# Patient Record
Sex: FEMALE | Race: BLACK OR AFRICAN AMERICAN | NOT HISPANIC OR LATINO | Employment: FULL TIME | URBAN - METROPOLITAN AREA
[De-identification: names, ages, dates, MRNs, and addresses within clinical notes are randomized per-mention and may not be internally consistent; named-entity substitution may affect disease eponyms.]

---

## 2018-03-13 ENCOUNTER — OFFICE VISIT (OUTPATIENT)
Dept: FAMILY MEDICINE CLINIC | Facility: CLINIC | Age: 43
End: 2018-03-13

## 2018-03-13 VITALS
WEIGHT: 109.5 LBS | RESPIRATION RATE: 12 BRPM | SYSTOLIC BLOOD PRESSURE: 114 MMHG | HEART RATE: 85 BPM | TEMPERATURE: 98.4 F | OXYGEN SATURATION: 99 % | BODY MASS INDEX: 18.69 KG/M2 | DIASTOLIC BLOOD PRESSURE: 76 MMHG | HEIGHT: 64 IN

## 2018-03-13 DIAGNOSIS — R42 LIGHTHEADEDNESS: ICD-10-CM

## 2018-03-13 DIAGNOSIS — R45.89 DEPRESSED MOOD: ICD-10-CM

## 2018-03-13 DIAGNOSIS — F41.9 ANXIETY: ICD-10-CM

## 2018-03-13 DIAGNOSIS — R26.89 LOSS OF BALANCE: ICD-10-CM

## 2018-03-13 DIAGNOSIS — R63.4 WEIGHT LOSS: Primary | ICD-10-CM

## 2018-03-13 DIAGNOSIS — R05.9 COUGH: ICD-10-CM

## 2018-03-13 PROCEDURE — 99203 OFFICE O/P NEW LOW 30 MIN: CPT | Performed by: FAMILY MEDICINE

## 2018-03-13 RX ORDER — ESCITALOPRAM OXALATE 10 MG/1
10 TABLET ORAL DAILY
Qty: 30 TABLET | Refills: 2 | Status: SHIPPED | OUTPATIENT
Start: 2018-03-13 | End: 2020-04-06

## 2018-03-13 NOTE — PROGRESS NOTES
Assessment/Plan       Problems Addressed this Visit     None      Visit Diagnoses     Weight loss    -  Primary    Relevant Orders    CBC & Differential    Comprehensive Metabolic Panel    TSH    T3    T4, Free    Lightheadedness        Relevant Orders    CBC & Differential    Comprehensive Metabolic Panel    TSH    Loss of balance        Anxiety        Relevant Medications    escitalopram (LEXAPRO) 10 MG tablet    Depressed mood        Relevant Medications    escitalopram (LEXAPRO) 10 MG tablet    Cough                Follow up: Return for follow up depends on review of labs and testing.     DISCUSSION  Check labs to eval for other cause. ? Thyroid issues, anemia.   If normal, rec start Lexapro as noted.   Follow up in one month if starts Lexapro  Will call with results.     Cough may be allergy or GERD . Try OTC claritin and call if not helpful. She agrees      MEDICATIONS PRESCRIBED  Requested Prescriptions     Signed Prescriptions Disp Refills   • escitalopram (LEXAPRO) 10 MG tablet 30 tablet 2     Sig: Take 1 tablet by mouth Daily.        -------------------------------------------    Subjective     Chief Complaint   Patient presents with   • Stress   • off balance     more when ready to start cycle   • Establish Care         History of Present Illness    Lightheadedness  Off balance and some dizziness  Hard to gain wt  dry skin  Decreased concentration  Not losing wt.   Lose appetite with worry    Sx worse around period  LMP 2018, regular    Saw Dr Shane 2 months, was told it was stress  Did labs and was normal ( not recently)    Anxious : short of breath      Anxious and worry a lot    Depression  Since father  and has been able to function but worse since mother   No SI    Meds in the past: antidepressant, Celexa. Took  X 3 weeks. No help with med      Mother  last week. HAd bad heart valve, developed fluid in lungs, ECMO. HAd gotten the flu and worse and ARDS    Decreased sleep  3 kids  "(20,16, 13). Aydin Bennett    No new stress    Cough  Off and on since 2/20/2018  No cold sx  Had been at  with mom  No fever  Never a smoker  Occ heartburn, not worse  ? Allergies, eyes itch in the summer  Some sneeze this past weekend          Past Medical History,Medications, Allergies, and social history was reviewed.      Review of Systems   Constitutional: Positive for fatigue and unexpected weight loss.   HENT: Negative.    Respiratory: Positive for cough. Negative for shortness of breath.    Cardiovascular: Positive for palpitations. Negative for chest pain and leg swelling.   Gastrointestinal: Negative.    Musculoskeletal: Negative.    Neurological: Positive for dizziness and light-headedness.   Psychiatric/Behavioral: Positive for depressed mood and stress. Negative for suicidal ideas. The patient is nervous/anxious.        Objective     Vitals:    03/13/18 1405   BP: 114/76   Pulse: 85   Resp: 12   Temp: 98.4 °F (36.9 °C)   TempSrc: Temporal Artery    SpO2: 99%   Weight: 49.7 kg (109 lb 8 oz)   Height: 162.6 cm (64\")          Physical Exam   Constitutional: She is oriented to person, place, and time. She appears well-developed and well-nourished. No distress.   HENT:   Head: Normocephalic.   Right Ear: Hearing, tympanic membrane, external ear and ear canal normal.   Left Ear: Hearing, tympanic membrane, external ear and ear canal normal.   Nose: Nose normal.   Mouth/Throat: Oropharynx is clear and moist. No oropharyngeal exudate.   Eyes: Conjunctivae and EOM are normal. Pupils are equal, round, and reactive to light.   Neck: Normal range of motion. Neck supple. No thyromegaly present.   Cardiovascular: Normal rate, regular rhythm and normal heart sounds.  Exam reveals no gallop and no friction rub.    No murmur heard.  Pulmonary/Chest: Effort normal and breath sounds normal. No respiratory distress. She has no wheezes. She has no rales.   Abdominal: Soft. Bowel sounds are normal. She exhibits " no distension and no mass. There is no tenderness. There is no rebound and no guarding.   Musculoskeletal: She exhibits no edema.   Lymphadenopathy:     She has no cervical adenopathy.   Neurological: She is alert and oriented to person, place, and time. She displays no atrophy. No cranial nerve deficit. She exhibits normal muscle tone. She displays a negative Romberg sign. Gait normal.   Reflex Scores:       Patellar reflexes are 2+ on the right side and 2+ on the left side.  Skin: Skin is warm. She is not diaphoretic.   Psychiatric: She has a normal mood and affect. Her behavior is normal. Judgment and thought content normal.   Nursing note and vitals reviewed.              Jaime Arizmendi MD

## 2018-03-14 LAB
ALBUMIN SERPL-MCNC: 4 G/DL (ref 3.2–4.8)
ALBUMIN/GLOB SERPL: 1.3 G/DL (ref 1.5–2.5)
ALP SERPL-CCNC: 47 U/L (ref 25–100)
ALT SERPL-CCNC: 12 U/L (ref 7–40)
AST SERPL-CCNC: 16 U/L (ref 0–33)
BASOPHILS # BLD AUTO: 0.02 10*3/MM3 (ref 0–0.2)
BASOPHILS NFR BLD AUTO: 0.3 % (ref 0–1)
BILIRUB SERPL-MCNC: 0.4 MG/DL (ref 0.3–1.2)
BUN SERPL-MCNC: 13 MG/DL (ref 9–23)
BUN/CREAT SERPL: 16.3 (ref 7–25)
CALCIUM SERPL-MCNC: 8.9 MG/DL (ref 8.7–10.4)
CHLORIDE SERPL-SCNC: 108 MMOL/L (ref 99–109)
CO2 SERPL-SCNC: 26 MMOL/L (ref 20–31)
CREAT SERPL-MCNC: 0.8 MG/DL (ref 0.6–1.3)
EOSINOPHIL # BLD AUTO: 0.27 10*3/MM3 (ref 0–0.3)
EOSINOPHIL NFR BLD AUTO: 3.8 % (ref 0–3)
ERYTHROCYTE [DISTWIDTH] IN BLOOD BY AUTOMATED COUNT: 14.5 % (ref 11.3–14.5)
GFR SERPLBLD CREATININE-BSD FMLA CKD-EPI: 79 ML/MIN/1.73
GFR SERPLBLD CREATININE-BSD FMLA CKD-EPI: 96 ML/MIN/1.73
GLOBULIN SER CALC-MCNC: 3 GM/DL
GLUCOSE SERPL-MCNC: 87 MG/DL (ref 70–100)
HCT VFR BLD AUTO: 36.2 % (ref 34.5–44)
HGB BLD-MCNC: 11.4 G/DL (ref 11.5–15.5)
IMM GRANULOCYTES # BLD: 0.02 10*3/MM3 (ref 0–0.03)
IMM GRANULOCYTES NFR BLD: 0.3 % (ref 0–0.6)
LYMPHOCYTES # BLD AUTO: 2.63 10*3/MM3 (ref 0.6–4.8)
LYMPHOCYTES NFR BLD AUTO: 36.5 % (ref 24–44)
MCH RBC QN AUTO: 29.2 PG (ref 27–31)
MCHC RBC AUTO-ENTMCNC: 31.5 G/DL (ref 32–36)
MCV RBC AUTO: 92.8 FL (ref 80–99)
MONOCYTES # BLD AUTO: 0.87 10*3/MM3 (ref 0–1)
MONOCYTES NFR BLD AUTO: 12.1 % (ref 0–12)
NEUTROPHILS # BLD AUTO: 3.39 10*3/MM3 (ref 1.5–8.3)
NEUTROPHILS NFR BLD AUTO: 47 % (ref 41–71)
PLATELET # BLD AUTO: 248 10*3/MM3 (ref 150–450)
POTASSIUM SERPL-SCNC: 4.6 MMOL/L (ref 3.5–5.5)
PROT SERPL-MCNC: 7 G/DL (ref 5.7–8.2)
RBC # BLD AUTO: 3.9 10*6/MM3 (ref 3.89–5.14)
SODIUM SERPL-SCNC: 141 MMOL/L (ref 132–146)
T3 SERPL-MCNC: 101 NG/DL (ref 71–180)
T4 FREE SERPL-MCNC: 1.17 NG/DL (ref 0.89–1.76)
TSH SERPL DL<=0.005 MIU/L-ACNC: 1.37 MIU/ML (ref 0.35–5.35)
WBC # BLD AUTO: 7.2 10*3/MM3 (ref 3.5–10.8)

## 2018-03-16 ENCOUNTER — TELEPHONE (OUTPATIENT)
Dept: FAMILY MEDICINE CLINIC | Facility: CLINIC | Age: 43
End: 2018-03-16

## 2018-03-16 NOTE — TELEPHONE ENCOUNTER
----- Message from Radha Hernandez sent at 3/16/2018 12:28 PM EDT -----  PT RETURNED YOUR CALL I PROVIDED THE FOLLOWING :     Please call: Blood work shows a very mild/minimal anemia.  Normal is 11.5 and hers is 11.4.  This would not be causing any of her symptoms.Kidney function and liver function tests are normal.  Blood sugar is normal.Thyroid levels are also normal.Recommend to go ahead and start Lexapro as we discussed and follow-up in one month.      PT UNDERSTOOD

## 2020-04-06 ENCOUNTER — OFFICE VISIT (OUTPATIENT)
Dept: FAMILY MEDICINE CLINIC | Facility: CLINIC | Age: 45
End: 2020-04-06

## 2020-04-06 VITALS
TEMPERATURE: 98.1 F | WEIGHT: 110.6 LBS | HEIGHT: 64 IN | HEART RATE: 72 BPM | OXYGEN SATURATION: 99 % | SYSTOLIC BLOOD PRESSURE: 118 MMHG | BODY MASS INDEX: 18.88 KG/M2 | RESPIRATION RATE: 16 BRPM | DIASTOLIC BLOOD PRESSURE: 76 MMHG

## 2020-04-06 DIAGNOSIS — R42 LIGHTHEADEDNESS: Primary | ICD-10-CM

## 2020-04-06 DIAGNOSIS — F41.9 ANXIETY: ICD-10-CM

## 2020-04-06 LAB
ALBUMIN SERPL-MCNC: 4.2 G/DL (ref 3.5–5.2)
ALBUMIN/GLOB SERPL: 1.4 G/DL
ALP SERPL-CCNC: 41 U/L (ref 39–117)
ALT SERPL-CCNC: 14 U/L (ref 1–33)
AST SERPL-CCNC: 16 U/L (ref 1–32)
BASOPHILS # BLD AUTO: 0.05 10*3/MM3 (ref 0–0.2)
BASOPHILS NFR BLD AUTO: 0.9 % (ref 0–1.5)
BILIRUB SERPL-MCNC: 0.5 MG/DL (ref 0.2–1.2)
BUN SERPL-MCNC: 10 MG/DL (ref 6–20)
BUN/CREAT SERPL: 10.3 (ref 7–25)
CALCIUM SERPL-MCNC: 9.4 MG/DL (ref 8.6–10.5)
CHLORIDE SERPL-SCNC: 102 MMOL/L (ref 98–107)
CO2 SERPL-SCNC: 23.4 MMOL/L (ref 22–29)
CREAT SERPL-MCNC: 0.97 MG/DL (ref 0.57–1)
EOSINOPHIL # BLD AUTO: 0.46 10*3/MM3 (ref 0–0.4)
EOSINOPHIL NFR BLD AUTO: 8.5 % (ref 0.3–6.2)
ERYTHROCYTE [DISTWIDTH] IN BLOOD BY AUTOMATED COUNT: 13.1 % (ref 12.3–15.4)
GLOBULIN SER CALC-MCNC: 3.1 GM/DL
GLUCOSE SERPL-MCNC: 73 MG/DL (ref 65–99)
HCT VFR BLD AUTO: 38.3 % (ref 34–46.6)
HGB BLD-MCNC: 12.4 G/DL (ref 12–15.9)
IMM GRANULOCYTES # BLD AUTO: 0.01 10*3/MM3 (ref 0–0.05)
IMM GRANULOCYTES NFR BLD AUTO: 0.2 % (ref 0–0.5)
LYMPHOCYTES # BLD AUTO: 1.86 10*3/MM3 (ref 0.7–3.1)
LYMPHOCYTES NFR BLD AUTO: 34.5 % (ref 19.6–45.3)
MCH RBC QN AUTO: 29.9 PG (ref 26.6–33)
MCHC RBC AUTO-ENTMCNC: 32.4 G/DL (ref 31.5–35.7)
MCV RBC AUTO: 92.3 FL (ref 79–97)
MONOCYTES # BLD AUTO: 0.59 10*3/MM3 (ref 0.1–0.9)
MONOCYTES NFR BLD AUTO: 10.9 % (ref 5–12)
NEUTROPHILS # BLD AUTO: 2.42 10*3/MM3 (ref 1.7–7)
NEUTROPHILS NFR BLD AUTO: 45 % (ref 42.7–76)
NRBC BLD AUTO-RTO: 0 /100 WBC (ref 0–0.2)
PLATELET # BLD AUTO: 259 10*3/MM3 (ref 140–450)
POTASSIUM SERPL-SCNC: 4.8 MMOL/L (ref 3.5–5.2)
PROT SERPL-MCNC: 7.3 G/DL (ref 6–8.5)
RBC # BLD AUTO: 4.15 10*6/MM3 (ref 3.77–5.28)
SODIUM SERPL-SCNC: 135 MMOL/L (ref 136–145)
WBC # BLD AUTO: 5.39 10*3/MM3 (ref 3.4–10.8)

## 2020-04-06 PROCEDURE — 99214 OFFICE O/P EST MOD 30 MIN: CPT | Performed by: FAMILY MEDICINE

## 2020-04-06 RX ORDER — ESCITALOPRAM OXALATE 10 MG/1
10 TABLET ORAL DAILY
Qty: 30 TABLET | Refills: 2 | Status: SHIPPED | OUTPATIENT
Start: 2020-04-06 | End: 2021-02-04 | Stop reason: SDUPTHER

## 2020-04-06 RX ORDER — SULFAMETHOXAZOLE AND TRIMETHOPRIM 800; 160 MG/1; MG/1
1 TABLET ORAL 2 TIMES DAILY
COMMUNITY
End: 2021-02-04

## 2020-04-06 NOTE — PROGRESS NOTES
Assessment/Plan       Problems Addressed this Visit     None      Visit Diagnoses     Lightheadedness    -  Primary    Relevant Orders    CBC & Differential    Comprehensive Metabolic Panel    Anxiety        Relevant Medications    escitalopram (Lexapro) 10 MG tablet            Follow up: Return if symptoms worsen or fail to improve, for follow up depends on review of labs and testing.     DISCUSSION  Lightheadedness.  May be due to anxiety.  Has had very minimal slight anemia in the past.  Recheck CBC and CMP.  In the meantime, start Lexapro.  Side effects previously explained.  If needs to come off of this in the future, will need to wean off of it.  Further plan once labs back.  She agrees to try the Lexapro again and understands that she needs to take it daily.          MEDICATIONS PRESCRIBED  Requested Prescriptions     Signed Prescriptions Disp Refills   • escitalopram (Lexapro) 10 MG tablet 30 tablet 2     Sig: Take 1 tablet by mouth Daily.          -------------------------------------------    Subjective     Chief Complaint   Patient presents with   • Anxiety     lightheaded, sweating, nervous, anxious, not sleeping         Anxiety   Presents for follow-up (x years and worse in last 1-2 months. Started aroudn the time of pandemic( gettig worse)) visit. Symptoms include insomnia, irritability and nervous/anxious behavior. Patient reports no depressed mood, panic or suicidal ideas. Symptoms occur constantly. The severity of symptoms is moderate. The quality of sleep is poor.         Had been on Lexapro and did not take it like how she was supposed. Did not take daily    No side effects (? )    Lives with     Lightheadedness  Gets Lightheaded and dizzy feeling  No syncope  No chest pain   Occ has to catch breath    Seems to be when anxious    Headaches  Getting headaches as well  Frontal head and temples  Comes and goes  Vision ok , no change   No weakness no numbness      Not  "working  [employer]  Extended off til 4/20/2020      LMP: last week. Heavy 1st day.     On Bactrim for UTI  Has been on for 5 days  Getting better          Social History     Tobacco Use   Smoking Status Never Smoker   Smokeless Tobacco Never Used          Past Medical History,Medications, Allergies, and social history was reviewed.          Review of Systems   Constitutional: Positive for irritability.   HENT: Negative.    Respiratory: Negative.    Cardiovascular: Negative.    Gastrointestinal: Negative.    Psychiatric/Behavioral: Negative for suicidal ideas and depressed mood. The patient is nervous/anxious and has insomnia.        Objective     Vitals:    04/06/20 1109   BP: 118/76   BP Location: Left arm   Patient Position: Sitting   Cuff Size: Small Adult   Pulse: 72   Resp: 16   Temp: 98.1 °F (36.7 °C)   TempSrc: Temporal   SpO2: 99%   Weight: 50.2 kg (110 lb 9.6 oz)   Height: 162.6 cm (64.02\")          Physical Exam   Constitutional: She appears well-developed and well-nourished.   HENT:   Head: Normocephalic and atraumatic.   Right Ear: Hearing, tympanic membrane, external ear and ear canal normal.   Left Ear: Hearing, tympanic membrane, external ear and ear canal normal.   Mouth/Throat: Oropharynx is clear and moist.   Eyes: Pupils are equal, round, and reactive to light. Conjunctivae and EOM are normal.   Neck: Normal range of motion. Neck supple. No thyromegaly present.   Cardiovascular: Normal rate, regular rhythm and normal heart sounds. Exam reveals no gallop and no friction rub.   No murmur heard.  Pulmonary/Chest: Effort normal and breath sounds normal. No respiratory distress. She has no wheezes. She has no rales.   Musculoskeletal: She exhibits no edema.   Neurological: She is alert.   Skin: Skin is warm and dry.   Psychiatric: She has a normal mood and affect.   Nursing note and vitals reviewed.                Jaime Arizmendi MD    "

## 2021-02-04 ENCOUNTER — OFFICE VISIT (OUTPATIENT)
Dept: FAMILY MEDICINE CLINIC | Facility: CLINIC | Age: 46
End: 2021-02-04

## 2021-02-04 VITALS
SYSTOLIC BLOOD PRESSURE: 120 MMHG | WEIGHT: 116 LBS | HEART RATE: 78 BPM | DIASTOLIC BLOOD PRESSURE: 72 MMHG | RESPIRATION RATE: 18 BRPM | BODY MASS INDEX: 19.81 KG/M2 | HEIGHT: 64 IN | TEMPERATURE: 97.7 F

## 2021-02-04 DIAGNOSIS — R42 LIGHTHEADEDNESS: Primary | ICD-10-CM

## 2021-02-04 DIAGNOSIS — F41.9 ANXIETY: ICD-10-CM

## 2021-02-04 PROCEDURE — 99213 OFFICE O/P EST LOW 20 MIN: CPT | Performed by: FAMILY MEDICINE

## 2021-02-04 RX ORDER — ESCITALOPRAM OXALATE 10 MG/1
10 TABLET ORAL DAILY
Qty: 30 TABLET | Refills: 5 | Status: SHIPPED | OUTPATIENT
Start: 2021-02-04 | End: 2021-06-21 | Stop reason: SDUPTHER

## 2021-02-04 NOTE — PROGRESS NOTES
Assessment/Plan       Diagnoses and all orders for this visit:    1. Lightheadedness (Primary)    2. Anxiety  -     escitalopram (Lexapro) 10 MG tablet; Take 1 tablet by mouth Daily.  Dispense: 30 tablet; Refill: 5           Follow up: Return if symptoms worsen or fail to improve.     DISCUSSION  Chronic lightheadedness.  Seems to get better when on Lexapro.  Has been off of the Lexapro since October.  She would like to restart.  She has had this issues for the last 13 years.  Has had previous MRI and tilt table examination.    Restart Lexapro 10 mg daily.  Recommend keep diary and let me know if the dizziness/lightheadedness does not improve.  She agrees.          MEDICATIONS PRESCRIBED  Requested Prescriptions     Signed Prescriptions Disp Refills   • escitalopram (Lexapro) 10 MG tablet 30 tablet 5     Sig: Take 1 tablet by mouth Daily.            -------------------------------------------    Subjective     Chief Complaint   Patient presents with   • Dizziness   • Night Sweats   • Anxiety         History of Present Illness    Off Lexapro since Oct  Worse since Dec  Feels off balance  Worse when going o have period    dizziness feeling  Comes and goes  Just happens and not when getting up    Has had off and on for quite some time    Ears itch but no hearing loss  No ringing usually    No weakness or numbness    Wears contacts  No double vision    No recent imaging of the brain     Some anxiety and had less anxiety when on the lexapro  Wants to go back on this    Has had for 13 years has had the dizziness  Had MRI brain 6-7 yrs ago and was because of the dizziness  And tilt table 6-7 yrs ago.     Night sweats when off the lexapro    LMP: on now          Social History     Tobacco Use   Smoking Status Never Smoker   Smokeless Tobacco Never Used          Past Medical History,Medications, Allergies, and social history was reviewed.          Review of Systems   Constitutional: Negative.    HENT: Negative.   "  Respiratory: Negative.    Cardiovascular: Negative.    Gastrointestinal: Negative.    Neurological: Positive for dizziness and light-headedness.   Psychiatric/Behavioral: The patient is nervous/anxious.        Objective     Vitals:    02/04/21 1614   BP: 120/72   Pulse: 78   Resp: 18   Temp: 97.7 °F (36.5 °C)   Weight: 52.6 kg (116 lb)   Height: 162.6 cm (64\")          Physical Exam  Vitals signs and nursing note reviewed.   Constitutional:       General: She is not in acute distress.     Appearance: She is well-developed. She is not ill-appearing.   HENT:      Head: Normocephalic and atraumatic.      Right Ear: Hearing, tympanic membrane, ear canal and external ear normal.      Left Ear: Hearing, tympanic membrane, ear canal and external ear normal.      Nose: Nose normal. No congestion or rhinorrhea.      Mouth/Throat:      Mouth: Mucous membranes are moist.      Pharynx: No oropharyngeal exudate or posterior oropharyngeal erythema.   Eyes:      General:         Right eye: No discharge.         Left eye: No discharge.      Conjunctiva/sclera: Conjunctivae normal.      Pupils: Pupils are equal, round, and reactive to light.   Neck:      Musculoskeletal: Normal range of motion and neck supple.      Thyroid: No thyromegaly.   Cardiovascular:      Rate and Rhythm: Normal rate and regular rhythm.      Heart sounds: Normal heart sounds. No murmur. No friction rub. No gallop.    Pulmonary:      Effort: Pulmonary effort is normal. No respiratory distress.      Breath sounds: Normal breath sounds. No wheezing or rales.   Abdominal:      General: Bowel sounds are normal. There is no distension.      Palpations: Abdomen is soft. There is no mass.      Tenderness: There is no abdominal tenderness. There is no guarding or rebound.   Musculoskeletal:      Right lower leg: No edema.      Left lower leg: No edema.   Lymphadenopathy:      Cervical: No cervical adenopathy.   Skin:     General: Skin is warm and dry.      " Coloration: Skin is not jaundiced or pale.   Neurological:      General: No focal deficit present.      Mental Status: She is alert. Mental status is at baseline.      Cranial Nerves: No cranial nerve deficit.      Gait: Gait normal.      Comments: No focal deficits.  Upper extremity strength is normal.  Reflexes normal.  Romberg is negative.   Psychiatric:         Mood and Affect: Mood normal.         Behavior: Behavior normal.                     Jaime Arizmendi MD

## 2021-06-21 ENCOUNTER — TELEPHONE (OUTPATIENT)
Dept: FAMILY MEDICINE CLINIC | Facility: CLINIC | Age: 46
End: 2021-06-21

## 2021-06-21 ENCOUNTER — OFFICE VISIT (OUTPATIENT)
Dept: FAMILY MEDICINE CLINIC | Facility: CLINIC | Age: 46
End: 2021-06-21

## 2021-06-21 VITALS
RESPIRATION RATE: 18 BRPM | HEART RATE: 82 BPM | SYSTOLIC BLOOD PRESSURE: 120 MMHG | TEMPERATURE: 98.7 F | BODY MASS INDEX: 19.26 KG/M2 | WEIGHT: 112.8 LBS | HEIGHT: 64 IN | DIASTOLIC BLOOD PRESSURE: 78 MMHG

## 2021-06-21 DIAGNOSIS — R42 DIZZINESS: ICD-10-CM

## 2021-06-21 DIAGNOSIS — F41.9 ANXIETY: Primary | ICD-10-CM

## 2021-06-21 DIAGNOSIS — L72.3 INFECTED SEBACEOUS CYST OF SKIN: ICD-10-CM

## 2021-06-21 DIAGNOSIS — L08.9 INFECTED SEBACEOUS CYST OF SKIN: ICD-10-CM

## 2021-06-21 PROCEDURE — 99214 OFFICE O/P EST MOD 30 MIN: CPT | Performed by: FAMILY MEDICINE

## 2021-06-21 RX ORDER — SULFAMETHOXAZOLE AND TRIMETHOPRIM 800; 160 MG/1; MG/1
1 TABLET ORAL 2 TIMES DAILY
Qty: 20 TABLET | Refills: 0 | Status: SHIPPED | OUTPATIENT
Start: 2021-06-21 | End: 2021-12-29

## 2021-06-21 RX ORDER — HYDROXYZINE HYDROCHLORIDE 25 MG/1
25 TABLET, FILM COATED ORAL 3 TIMES DAILY PRN
Qty: 30 TABLET | Refills: 1 | Status: SHIPPED | OUTPATIENT
Start: 2021-06-21 | End: 2022-06-14

## 2021-06-21 RX ORDER — ESCITALOPRAM OXALATE 20 MG/1
20 TABLET ORAL DAILY
Qty: 30 TABLET | Refills: 5 | Status: SHIPPED | OUTPATIENT
Start: 2021-06-21 | End: 2022-06-14

## 2021-06-21 NOTE — PROGRESS NOTES
Assessment/Plan       Diagnoses and all orders for this visit:    1. Anxiety (Primary)  -     escitalopram (Lexapro) 20 MG tablet; Take 1 tablet by mouth Daily.  Dispense: 30 tablet; Refill: 5  -     hydrOXYzine (ATARAX) 25 MG tablet; Take 1 tablet by mouth 3 (Three) Times a Day As Needed for Anxiety.  Dispense: 30 tablet; Refill: 1    2. Dizziness  -     CBC & Differential  -     Comprehensive Metabolic Panel    3. Infected sebaceous cyst of skin  -     sulfamethoxazole-trimethoprim (Bactrim DS) 800-160 MG per tablet; Take 1 tablet by mouth 2 (Two) Times a Day.  Dispense: 20 tablet; Refill: 0           Follow up: Return if symptoms worsen or fail to improve, for follow up depends on review of labs and testing.     DISCUSSION  Anxiety.  Worsening since sister diagnosed with breast cancer.  Lexapro 20 mg daily.  Hydroxyzine 25 mg 3 times a day as needed for anxiety and sleep.    Dizziness.  Check CBC and CMP.    Infected sebaceous cyst of skin right cheek.  Bactrim DS 1 twice a day.  Call if not improving.          MEDICATIONS PRESCRIBED  Requested Prescriptions     Signed Prescriptions Disp Refills   • escitalopram (Lexapro) 20 MG tablet 30 tablet 5     Sig: Take 1 tablet by mouth Daily.   • hydrOXYzine (ATARAX) 25 MG tablet 30 tablet 1     Sig: Take 1 tablet by mouth 3 (Three) Times a Day As Needed for Anxiety.   • sulfamethoxazole-trimethoprim (Bactrim DS) 800-160 MG per tablet 20 tablet 0     Sig: Take 1 tablet by mouth 2 (Two) Times a Day.          -------------------------------------------    Subjective     Chief Complaint   Patient presents with   • Anxiety         History of Present Illness    Anxiety  Increased anxiety  On lexapro 10 mg   Was helping some  Sister dx with breast cancer  Wakes up sweating at night  Feels like she needs to be there to help her  Tx started today. Getting chemotherapy. [facility]   Age 36 and has 2 kids    Patient gets breast lumps but go away with cycle    Decreased  "sleep    Children issues and taking care of sisters kids     Hard time work   Not able to get FMLA for siblings    Work 6 am to 5 pm.    Depressed feeling  No suicidal ideation    Dizziness still  Comes and goes  Worse when on period  Feels woozy  Now, more with anxiety  Has to leave the station when feels dizzy  No falls  No syncope    LMP now. Not heavy bleeding. 1st day some heaviness.     Had mammogram in Jan 2021 and was ok  [facility]  Sees Dr Potts          Social History     Tobacco Use   Smoking Status Never Smoker   Smokeless Tobacco Never Used          Past Medical History,Medications, Allergies, and social history was reviewed.          Review of Systems   Constitutional: Negative.    HENT: Negative.    Respiratory: Negative.    Cardiovascular: Negative.    Gastrointestinal: Negative.    Neurological: Negative.    Psychiatric/Behavioral: Positive for sleep disturbance, depressed mood and stress. Negative for suicidal ideas. The patient is nervous/anxious.        Objective     Vitals:    06/21/21 1036   BP: 120/78   Pulse: 82   Resp: 18   Temp: 98.7 °F (37.1 °C)   Weight: 51.2 kg (112 lb 12.8 oz)   Height: 162.6 cm (64\")          Physical Exam  Vitals and nursing note reviewed.   Constitutional:       Appearance: She is well-developed.   HENT:      Head: Normocephalic and atraumatic.      Right Ear: Hearing and external ear normal.      Left Ear: Hearing and external ear normal.   Eyes:      Conjunctiva/sclera: Conjunctivae normal.      Pupils: Pupils are equal, round, and reactive to light.   Cardiovascular:      Rate and Rhythm: Normal rate and regular rhythm.      Heart sounds: Normal heart sounds. No murmur heard.   No friction rub.   Pulmonary:      Effort: Pulmonary effort is normal. No respiratory distress.      Breath sounds: Normal breath sounds. No wheezing or rales.   Skin:     General: Skin is warm.   Neurological:      Mental Status: She is alert and oriented to person, place, and time. "   Psychiatric:         Mood and Affect: Mood is anxious.         Behavior: Behavior normal.       Right cheek: 1 cm sebaceous cyst.  Inflamed with some mild discharge.  Small right anterior lymph node.  Cervical.              Jaime Arizmendi MD

## 2021-06-22 LAB
ALBUMIN SERPL-MCNC: 4.2 G/DL (ref 3.5–5.2)
ALBUMIN/GLOB SERPL: 1.6 G/DL
ALP SERPL-CCNC: 49 U/L (ref 39–117)
ALT SERPL-CCNC: 10 U/L (ref 1–33)
AST SERPL-CCNC: 17 U/L (ref 1–32)
BASOPHILS # BLD AUTO: 0.04 10*3/MM3 (ref 0–0.2)
BASOPHILS NFR BLD AUTO: 0.6 % (ref 0–1.5)
BILIRUB SERPL-MCNC: 0.3 MG/DL (ref 0–1.2)
BUN SERPL-MCNC: 15 MG/DL (ref 6–20)
BUN/CREAT SERPL: 19 (ref 7–25)
CALCIUM SERPL-MCNC: 9.4 MG/DL (ref 8.6–10.5)
CHLORIDE SERPL-SCNC: 107 MMOL/L (ref 98–107)
CO2 SERPL-SCNC: 25.2 MMOL/L (ref 22–29)
CREAT SERPL-MCNC: 0.79 MG/DL (ref 0.57–1)
EOSINOPHIL # BLD AUTO: 0.36 10*3/MM3 (ref 0–0.4)
EOSINOPHIL NFR BLD AUTO: 5.8 % (ref 0.3–6.2)
ERYTHROCYTE [DISTWIDTH] IN BLOOD BY AUTOMATED COUNT: 12.7 % (ref 12.3–15.4)
GLOBULIN SER CALC-MCNC: 2.7 GM/DL
GLUCOSE SERPL-MCNC: 89 MG/DL (ref 65–99)
HCT VFR BLD AUTO: 37.6 % (ref 34–46.6)
HGB BLD-MCNC: 12.3 G/DL (ref 12–15.9)
IMM GRANULOCYTES # BLD AUTO: 0.02 10*3/MM3 (ref 0–0.05)
IMM GRANULOCYTES NFR BLD AUTO: 0.3 % (ref 0–0.5)
LYMPHOCYTES # BLD AUTO: 1.99 10*3/MM3 (ref 0.7–3.1)
LYMPHOCYTES NFR BLD AUTO: 32.1 % (ref 19.6–45.3)
MCH RBC QN AUTO: 30.5 PG (ref 26.6–33)
MCHC RBC AUTO-ENTMCNC: 32.7 G/DL (ref 31.5–35.7)
MCV RBC AUTO: 93.3 FL (ref 79–97)
MONOCYTES # BLD AUTO: 0.66 10*3/MM3 (ref 0.1–0.9)
MONOCYTES NFR BLD AUTO: 10.6 % (ref 5–12)
NEUTROPHILS # BLD AUTO: 3.13 10*3/MM3 (ref 1.7–7)
NEUTROPHILS NFR BLD AUTO: 50.6 % (ref 42.7–76)
NRBC BLD AUTO-RTO: 0 /100 WBC (ref 0–0.2)
PLATELET # BLD AUTO: 278 10*3/MM3 (ref 140–450)
POTASSIUM SERPL-SCNC: 4.5 MMOL/L (ref 3.5–5.2)
PROT SERPL-MCNC: 6.9 G/DL (ref 6–8.5)
RBC # BLD AUTO: 4.03 10*6/MM3 (ref 3.77–5.28)
SODIUM SERPL-SCNC: 141 MMOL/L (ref 136–145)
WBC # BLD AUTO: 6.2 10*3/MM3 (ref 3.4–10.8)

## 2021-12-29 ENCOUNTER — OFFICE VISIT (OUTPATIENT)
Dept: FAMILY MEDICINE CLINIC | Facility: CLINIC | Age: 46
End: 2021-12-29

## 2021-12-29 VITALS
BODY MASS INDEX: 19.46 KG/M2 | RESPIRATION RATE: 18 BRPM | TEMPERATURE: 98.2 F | HEART RATE: 78 BPM | HEIGHT: 64 IN | DIASTOLIC BLOOD PRESSURE: 68 MMHG | WEIGHT: 114 LBS | SYSTOLIC BLOOD PRESSURE: 110 MMHG

## 2021-12-29 DIAGNOSIS — E55.9 VITAMIN D DEFICIENCY: ICD-10-CM

## 2021-12-29 DIAGNOSIS — L65.9 HAIR LOSS: Primary | ICD-10-CM

## 2021-12-29 DIAGNOSIS — R05.3 PERSISTENT COUGH FOR 3 WEEKS OR LONGER: ICD-10-CM

## 2021-12-29 PROCEDURE — 99213 OFFICE O/P EST LOW 20 MIN: CPT | Performed by: PHYSICIAN ASSISTANT

## 2021-12-29 RX ORDER — MONTELUKAST SODIUM 10 MG/1
10 TABLET ORAL NIGHTLY
Qty: 30 TABLET | Refills: 2 | Status: SHIPPED | OUTPATIENT
Start: 2021-12-29 | End: 2022-06-14

## 2021-12-29 RX ORDER — AZITHROMYCIN 250 MG/1
TABLET, FILM COATED ORAL
Qty: 6 TABLET | Refills: 0 | Status: SHIPPED | OUTPATIENT
Start: 2021-12-29 | End: 2022-06-14

## 2021-12-30 LAB
25(OH)D3+25(OH)D2 SERPL-MCNC: 30.5 NG/ML (ref 30–100)
ALBUMIN SERPL-MCNC: 3.7 G/DL (ref 3.8–4.8)
ALBUMIN/GLOB SERPL: 1.3 {RATIO} (ref 1.2–2.2)
ALP SERPL-CCNC: 47 IU/L (ref 44–121)
ALT SERPL-CCNC: 13 IU/L (ref 0–32)
AST SERPL-CCNC: 17 IU/L (ref 0–40)
BASOPHILS # BLD AUTO: 0 X10E3/UL (ref 0–0.2)
BASOPHILS NFR BLD AUTO: 1 %
BILIRUB SERPL-MCNC: 0.3 MG/DL (ref 0–1.2)
BUN SERPL-MCNC: 10 MG/DL (ref 6–24)
BUN/CREAT SERPL: 11 (ref 9–23)
CALCIUM SERPL-MCNC: 9.2 MG/DL (ref 8.7–10.2)
CHLORIDE SERPL-SCNC: 107 MMOL/L (ref 96–106)
CO2 SERPL-SCNC: 26 MMOL/L (ref 20–29)
CREAT SERPL-MCNC: 0.87 MG/DL (ref 0.57–1)
EOSINOPHIL # BLD AUTO: 0.3 X10E3/UL (ref 0–0.4)
EOSINOPHIL NFR BLD AUTO: 5 %
ERYTHROCYTE [DISTWIDTH] IN BLOOD BY AUTOMATED COUNT: 12.6 % (ref 11.7–15.4)
GLOBULIN SER CALC-MCNC: 2.8 G/DL (ref 1.5–4.5)
GLUCOSE SERPL-MCNC: 67 MG/DL (ref 65–99)
HCT VFR BLD AUTO: 34.6 % (ref 34–46.6)
HGB BLD-MCNC: 11.3 G/DL (ref 11.1–15.9)
IMM GRANULOCYTES # BLD AUTO: 0 X10E3/UL (ref 0–0.1)
IMM GRANULOCYTES NFR BLD AUTO: 0 %
IRON SATN MFR SERPL: 33 % (ref 15–55)
IRON SERPL-MCNC: 89 UG/DL (ref 27–159)
LYMPHOCYTES # BLD AUTO: 2.1 X10E3/UL (ref 0.7–3.1)
LYMPHOCYTES NFR BLD AUTO: 29 %
MCH RBC QN AUTO: 30.5 PG (ref 26.6–33)
MCHC RBC AUTO-ENTMCNC: 32.7 G/DL (ref 31.5–35.7)
MCV RBC AUTO: 94 FL (ref 79–97)
MONOCYTES # BLD AUTO: 0.8 X10E3/UL (ref 0.1–0.9)
MONOCYTES NFR BLD AUTO: 11 %
NEUTROPHILS # BLD AUTO: 4 X10E3/UL (ref 1.4–7)
NEUTROPHILS NFR BLD AUTO: 54 %
PLATELET # BLD AUTO: 296 X10E3/UL (ref 150–450)
POTASSIUM SERPL-SCNC: 4.5 MMOL/L (ref 3.5–5.2)
PROT SERPL-MCNC: 6.5 G/DL (ref 6–8.5)
RBC # BLD AUTO: 3.7 X10E6/UL (ref 3.77–5.28)
SODIUM SERPL-SCNC: 145 MMOL/L (ref 134–144)
T4 FREE SERPL-MCNC: 0.96 NG/DL (ref 0.82–1.77)
TIBC SERPL-MCNC: 273 UG/DL (ref 250–450)
TSH SERPL DL<=0.005 MIU/L-ACNC: 1.24 UIU/ML (ref 0.45–4.5)
UIBC SERPL-MCNC: 184 UG/DL (ref 131–425)
VIT B12 SERPL-MCNC: 973 PG/ML (ref 232–1245)
WBC # BLD AUTO: 7.3 X10E3/UL (ref 3.4–10.8)

## 2022-01-03 NOTE — PROGRESS NOTES
I have reviewed the notes, assessments, and/or procedures performed by Nicole Richmond, I concur with her documentation of Connie SOLIS.

## 2022-06-14 ENCOUNTER — OFFICE VISIT (OUTPATIENT)
Dept: FAMILY MEDICINE CLINIC | Facility: CLINIC | Age: 47
End: 2022-06-14

## 2022-06-14 VITALS
TEMPERATURE: 97.8 F | WEIGHT: 115.5 LBS | HEART RATE: 72 BPM | DIASTOLIC BLOOD PRESSURE: 62 MMHG | SYSTOLIC BLOOD PRESSURE: 116 MMHG | OXYGEN SATURATION: 100 % | RESPIRATION RATE: 18 BRPM | BODY MASS INDEX: 19.72 KG/M2 | HEIGHT: 64 IN

## 2022-06-14 DIAGNOSIS — R42 LIGHT-HEADEDNESS: Primary | ICD-10-CM

## 2022-06-14 DIAGNOSIS — E55.9 VITAMIN D DEFICIENCY: ICD-10-CM

## 2022-06-14 DIAGNOSIS — R42 DIZZINESS: ICD-10-CM

## 2022-06-14 DIAGNOSIS — Z13.220 ENCOUNTER FOR LIPID SCREENING FOR CARDIOVASCULAR DISEASE: ICD-10-CM

## 2022-06-14 DIAGNOSIS — Z13.6 ENCOUNTER FOR LIPID SCREENING FOR CARDIOVASCULAR DISEASE: ICD-10-CM

## 2022-06-14 DIAGNOSIS — Z82.49 FAMILY HISTORY OF BRAIN ANEURYSM: ICD-10-CM

## 2022-06-14 DIAGNOSIS — F41.9 ANXIETY: ICD-10-CM

## 2022-06-14 PROCEDURE — 99214 OFFICE O/P EST MOD 30 MIN: CPT | Performed by: PHYSICIAN ASSISTANT

## 2022-06-14 RX ORDER — MECLIZINE HCL 12.5 MG/1
12.5 TABLET ORAL 3 TIMES DAILY PRN
Qty: 30 TABLET | Refills: 0 | Status: SHIPPED | OUTPATIENT
Start: 2022-06-14

## 2022-06-14 RX ORDER — BUSPIRONE HYDROCHLORIDE 7.5 MG/1
7.5 TABLET ORAL 3 TIMES DAILY
Qty: 90 TABLET | Refills: 2 | Status: SHIPPED | OUTPATIENT
Start: 2022-06-14 | End: 2023-03-16

## 2022-06-14 NOTE — PROGRESS NOTES
"Subjective   Connie SOLIS is a 47 y.o. female.     History of Present Illness   Pt presents with CC of light headedness/dizziness. Ongoing issue for many years (started around age 32. Notes she was at work and someone made her angry and she turned around and has had sensation off an on ever since)   Starting to become more persistent over the last several months   Feels like tilting to left side when walking   Feels like symptoms a little worse when worked up.   Even one alcoholic drink can make symptoms worse.   Last eye exam last year and normal. Sees [facility].  Not waking up with symptoms   Not worse with position changes. No neck pain   Symptoms worse at work vs at home   No nausea or vomiting   No headaches.    No chest pain or SOB   Feels like she could fall at times but doesn't.   Symptoms can last all day.   Worse around cycles. Cycles not very heavy other than first couple days.   Hx of anemia.   Taking MV right now.   Never had allergy testing. Does have a lot of seasonal allergies. Symptoms of light headedness in the past usually happen around the fall   Allergies usually worse in the fall. Has appointment with allergist scheduled.     Feels like everything stresses her out   Has only been on lexapro in the past for anxiety.    Medication made her feel very flat.   Would like to try something new.     Pt does not smoke.     Aunt has hx of brain aneurysm pt has never been screened.     No urinary symptoms     Negative MRI and tilt table test in the past per patient.      The following portions of the patient's history were reviewed and updated as appropriate: allergies, current medications, past family history, past medical history, past social history, past surgical history and problem list.    Review of Systems  As noted per HPI    Objective    Blood pressure 116/62, pulse 72, temperature 97.8 °F (36.6 °C), resp. rate 18, height 162.6 cm (64\"), weight 52.4 kg (115 lb 8 oz), SpO2 100 %.   "   Physical Exam  Vitals and nursing note reviewed.   Constitutional:       Appearance: She is well-developed.   HENT:      Head: Normocephalic and atraumatic.      Right Ear: External ear normal.      Left Ear: External ear normal.      Nose: Nose normal.      Mouth/Throat:      Pharynx: No oropharyngeal exudate.   Eyes:      Extraocular Movements: Extraocular movements intact.      Conjunctiva/sclera: Conjunctivae normal.      Pupils: Pupils are equal, round, and reactive to light.   Neck:      Thyroid: No thyromegaly.      Trachea: No tracheal deviation.   Cardiovascular:      Rate and Rhythm: Normal rate and regular rhythm.      Heart sounds: Normal heart sounds.   Pulmonary:      Effort: Pulmonary effort is normal. No respiratory distress.      Breath sounds: Normal breath sounds. No wheezing or rales.   Chest:      Chest wall: No tenderness.   Abdominal:      General: Bowel sounds are normal. There is no distension.      Palpations: Abdomen is soft. There is no mass.      Tenderness: There is no abdominal tenderness. There is no guarding or rebound.      Hernia: No hernia is present.   Musculoskeletal:      Cervical back: Normal range of motion and neck supple. No tenderness.   Lymphadenopathy:      Cervical: No cervical adenopathy.   Skin:     General: Skin is warm and dry.   Neurological:      Mental Status: She is alert and oriented to person, place, and time.      GCS: GCS eye subscore is 4. GCS verbal subscore is 5. GCS motor subscore is 6.      Cranial Nerves: Cranial nerves are intact.      Motor: Motor function is intact.      Coordination: Coordination is intact. Romberg sign negative.      Gait: Gait is intact.   Psychiatric:         Mood and Affect: Mood normal.         Behavior: Behavior normal.         Thought Content: Thought content normal.         Judgment: Judgment normal.         Assessment & Plan   Diagnoses and all orders for this visit:    1. Light-headedness (Primary)  -     CT Angiogram  Head; Future  -     CBC w AUTO Differential  -     Comprehensive metabolic panel  -     TSH  -     T4, free  -     Iron Profile  -     Thyroid Antibodies  -     ROSAS  -     Vitamin B12  -     Folate  -     Hemoglobin A1c  -     meclizine (ANTIVERT) 12.5 MG tablet; Take 1 tablet by mouth 3 (Three) Times a Day As Needed for Dizziness.  Dispense: 30 tablet; Refill: 0    2. Dizziness  -     CT Angiogram Head; Future  -     CBC w AUTO Differential  -     Comprehensive metabolic panel  -     TSH  -     T4, free  -     Iron Profile  -     Thyroid Antibodies  -     ROSAS  -     Vitamin B12  -     Folate  -     Hemoglobin A1c  -     meclizine (ANTIVERT) 12.5 MG tablet; Take 1 tablet by mouth 3 (Three) Times a Day As Needed for Dizziness.  Dispense: 30 tablet; Refill: 0    3. Family history of brain aneurysm  -     CT Angiogram Head; Future    4. Vitamin D deficiency  -     Vitamin D 25 hydroxy    5. Encounter for lipid screening for cardiovascular disease  -     Lipid Panel    6. Anxiety  -     busPIRone (BUSPAR) 7.5 MG tablet; Take 1 tablet by mouth 3 (Three) Times a Day.  Dispense: 90 tablet; Refill: 2      Screening CTA of head due to family hx of brain aneurysm   Check labs as noted   Meclizine to help with symptoms   Trial of Buspar to help with anxiety  Report to ER if new or worsening symptoms develop   F/u pending lab and imaging review.

## 2022-06-29 ENCOUNTER — HOSPITAL ENCOUNTER (OUTPATIENT)
Dept: CT IMAGING | Facility: HOSPITAL | Age: 47
Discharge: HOME | End: 2022-06-29
Admitting: PHYSICIAN ASSISTANT

## 2022-06-29 DIAGNOSIS — Z82.49 FAMILY HISTORY OF BRAIN ANEURYSM: ICD-10-CM

## 2022-06-29 DIAGNOSIS — R42 DIZZINESS: ICD-10-CM

## 2022-06-29 DIAGNOSIS — R42 LIGHT-HEADEDNESS: ICD-10-CM

## 2022-06-29 PROCEDURE — 70496 CT ANGIOGRAPHY HEAD: CPT

## 2022-06-29 PROCEDURE — 0 IOPAMIDOL PER 1 ML: Performed by: PHYSICIAN ASSISTANT

## 2022-06-29 RX ADMIN — IOPAMIDOL 100 ML: 755 INJECTION, SOLUTION INTRAVENOUS at 08:46

## 2023-03-16 ENCOUNTER — OFFICE VISIT (OUTPATIENT)
Dept: FAMILY MEDICINE CLINIC | Facility: CLINIC | Age: 48
End: 2023-03-16
Payer: COMMERCIAL

## 2023-03-16 VITALS
RESPIRATION RATE: 18 BRPM | BODY MASS INDEX: 20.49 KG/M2 | HEART RATE: 74 BPM | DIASTOLIC BLOOD PRESSURE: 78 MMHG | WEIGHT: 120 LBS | HEIGHT: 64 IN | TEMPERATURE: 98.9 F | OXYGEN SATURATION: 99 % | SYSTOLIC BLOOD PRESSURE: 122 MMHG

## 2023-03-16 DIAGNOSIS — Z11.59 ENCOUNTER FOR HEPATITIS C SCREENING TEST FOR LOW RISK PATIENT: ICD-10-CM

## 2023-03-16 DIAGNOSIS — R53.83 OTHER FATIGUE: ICD-10-CM

## 2023-03-16 DIAGNOSIS — Z86.39 HISTORY OF IRON DEFICIENCY: ICD-10-CM

## 2023-03-16 DIAGNOSIS — Z12.11 ENCOUNTER FOR SCREENING FOR MALIGNANT NEOPLASM OF COLON: Primary | ICD-10-CM

## 2023-03-16 PROCEDURE — 99396 PREV VISIT EST AGE 40-64: CPT

## 2023-03-16 NOTE — PROGRESS NOTES
"Chief Complaint   Patient presents with   • Annual Exam     May have low iron,   fatigued more than normal       Subjective      Connie Murry is a 47 y.o. female who presents today for a physical. Works day shift at [employer]. She has concerns with increased fatigue.     Anxiety - Tried Buspar, but it did not make a difference in her anxiety.  States she does not really like taking medications anyway, so she stopped taking it.    Fatigue - Has noticed increase in fatigue in the last 2-3 months.  States she is not tired when she wakes up in the morning but she has no energy when she gets home.       Had some heartburn and bloating in January.  She states that it resolved.  She took Tums and it helped.  No problems in a month or so.     Itching in bilateral ears -over the last month or so.  She has used Q-tips, but feels like she constantly has some itching in both years.  She has not had any ear pain or drainage.    Wellness-no history of abnormal pap (since teenager); or mammogram.  Needs colonoscopy.  Vaccines - not interested in second booster for COVID, needs Tdap.    Review of Systems   Constitutional: Positive for fatigue. Negative for appetite change, chills and fever.   HENT: Negative.    Eyes: Negative.    Respiratory: Negative.  Negative for chest tightness and shortness of breath.    Cardiovascular: Negative.  Negative for chest pain and palpitations.   Gastrointestinal: Negative.    Genitourinary: Negative.    Neurological: Positive for dizziness ( As always had intermittent dizziness, no increase in symptoms recently.).   Psychiatric/Behavioral: Negative.         Objective   Vital Signs:   /78   Pulse 74   Temp 98.9 °F (37.2 °C)   Resp 18   Ht 162.6 cm (64\")   Wt 54.4 kg (120 lb)   SpO2 99%   BMI 20.60 kg/m²     BMI is within normal parameters. No other follow-up for BMI required.      Physical Exam  Vitals and nursing note reviewed.   Constitutional:       Appearance: Normal appearance. "   HENT:      Head: Normocephalic.      Right Ear: Tympanic membrane and external ear normal. There is no impacted cerumen.      Left Ear: Tympanic membrane and external ear normal. There is no impacted cerumen.      Ears:      Comments: Dried cerumen, some mild erythema to bilateral ear canals, no drainage or swelling      Nose: Nose normal.      Mouth/Throat:      Mouth: Mucous membranes are moist.      Pharynx: No posterior oropharyngeal erythema.   Eyes:      General:         Right eye: No discharge.         Left eye: No discharge.      Extraocular Movements: Extraocular movements intact.      Pupils: Pupils are equal, round, and reactive to light.   Neck:      Thyroid: No thyroid mass, thyromegaly or thyroid tenderness.   Cardiovascular:      Rate and Rhythm: Normal rate and regular rhythm.      Pulses: Normal pulses.      Heart sounds: Normal heart sounds.   Pulmonary:      Effort: Pulmonary effort is normal.      Breath sounds: Normal breath sounds.   Abdominal:      General: Bowel sounds are normal.      Palpations: Abdomen is soft.   Musculoskeletal:         General: Normal range of motion.      Cervical back: Normal range of motion.      Right lower leg: No edema.      Left lower leg: No edema.   Lymphadenopathy:      Cervical: No cervical adenopathy.   Skin:     General: Skin is warm.   Neurological:      Mental Status: She is alert.          Result Review                  Immunization History   Administered Date(s) Administered   • COVID-19 (PFIZER) PURPLE CAP 03/11/2021, 04/08/2021, 12/17/2021       Health Maintenance   Topic Date Due   • HEPATITIS C SCREENING  Never done   • COVID-19 Vaccine (4 - Booster for Pfizer series) 03/21/2023 (Originally 2/11/2022)   • INFLUENZA VACCINE  03/31/2023 (Originally 8/1/2022)   • COLORECTAL CANCER SCREENING  04/11/2023 (Originally 1975)   • TDAP/TD VACCINES (1 - Tdap) 07/04/2023 (Originally 4/12/1996)   • ANNUAL PHYSICAL  03/16/2024   • PAP SMEAR  12/01/2025   •  Pneumococcal Vaccine 0-64  Aged Out          Assessment and Plan  Diagnoses and all orders for this visit:    1. Encounter for screening for malignant neoplasm of colon (Primary)  -     Ambulatory Referral For Screening Colonoscopy; Future    2. Other fatigue  -     CBC & Differential; Future  -     TSH Rfx On Abnormal To Free T4; Future  -     Vitamin B12; Future  -     Vitamin D,25-Hydroxy; Future  -     Comprehensive Metabolic Panel; Future    3. History of iron deficiency  -     Iron Profile; Future  -     Ferritin    4. Encounter for hepatitis C screening test for low risk patient  -     Hepatitis C Antibody; Future         Counseled on health maintenance topics and preventative care recommendations.         Patient Instructions   Update Tdap vaccine and COVID booster at pharmacy at patient convenience.  Colonoscopy ordered - office to call to schedule  Labs today - will call with results and further plan   Follow up in 5-6 weeks for recheck, sooner if needed    Discussed wellness with patient including preventative care (vaccines and recommended screenings). Discussed recommendations for diet and exercise.  Renu importance of adequate fluid intake daily to help prevent orthostatic hypotension/dizziness.    Discussed option of medication - specifically Lexapro for anxiety.  She is agreeable to try this option. Discussed medication safety and side effects.  Will defer ordering medication until we rule out other causes of anxiety (I.e. thyroid function).  Patient is agreeable with this plan. Discussed medications prescribed and OTC medications recommended.  Discussed medication safety, possible side effects and how to take or administer medications. Instructed patient to report any adverse reactions, side effects or concerns.     Discussed lab work ordered today and rationale with patient who verbalized understanding and agrees with plan of care.     Patient was given opportunity to ask questions and all concerns  were addressed prior to the conclusion of today's visit.     Follow Up  Return in about 6 weeks (around 4/27/2023).  Patient was given instructions and counseling regarding her condition or for health maintenance advice. Please see specific information pulled into the AVS if appropriate.

## 2023-03-16 NOTE — PATIENT INSTRUCTIONS
Update Tdap vaccine and COVID booster at pharmacy at patient convenience.  Colonoscopy ordered - office to call to schedule  Labs today - will call with results and further plan   Follow up in 5-6 weeks for recheck, sooner if needed

## 2023-03-21 ENCOUNTER — TELEPHONE (OUTPATIENT)
Dept: FAMILY MEDICINE CLINIC | Facility: CLINIC | Age: 48
End: 2023-03-21
Payer: COMMERCIAL

## 2023-03-21 DIAGNOSIS — D50.9 IRON DEFICIENCY ANEMIA, UNSPECIFIED IRON DEFICIENCY ANEMIA TYPE: Primary | ICD-10-CM

## 2023-03-21 DIAGNOSIS — E55.9 VITAMIN D INSUFFICIENCY: ICD-10-CM

## 2023-03-21 DIAGNOSIS — R74.8 ELEVATED VITAMIN B12 LEVEL: ICD-10-CM

## 2023-03-21 RX ORDER — DOXYCYCLINE HYCLATE 50 MG/1
324 CAPSULE, GELATIN COATED ORAL
Qty: 90 TABLET | Refills: 0 | Status: SHIPPED | OUTPATIENT
Start: 2023-03-21

## 2023-03-21 NOTE — TELEPHONE ENCOUNTER
It could be her multivitamin supplement - which likely has B12.  Some over the counter drinks and energy supplements have a lot of B12 too.  She can check labels of anything she is taking.  I am not overly concerned at this point with it being elevated as there are no other lab abnormalities associated with elevated B12.

## 2023-03-21 NOTE — TELEPHONE ENCOUNTER
----- Message from Kasandra Henderson MA sent at 3/21/2023  1:07 PM EDT -----  Pt informed and understood she is not taking Biotin or any other supplement for B 12, What would cause this to be high

## 2023-04-27 ENCOUNTER — OFFICE VISIT (OUTPATIENT)
Dept: FAMILY MEDICINE CLINIC | Facility: CLINIC | Age: 48
End: 2023-04-27
Payer: COMMERCIAL

## 2023-04-27 VITALS
DIASTOLIC BLOOD PRESSURE: 64 MMHG | SYSTOLIC BLOOD PRESSURE: 114 MMHG | WEIGHT: 119 LBS | TEMPERATURE: 97.7 F | HEART RATE: 67 BPM | HEIGHT: 67 IN | BODY MASS INDEX: 18.68 KG/M2 | OXYGEN SATURATION: 99 % | RESPIRATION RATE: 18 BRPM

## 2023-04-27 DIAGNOSIS — D50.9 IRON DEFICIENCY ANEMIA, UNSPECIFIED IRON DEFICIENCY ANEMIA TYPE: ICD-10-CM

## 2023-04-27 DIAGNOSIS — K21.9 GASTROESOPHAGEAL REFLUX DISEASE WITHOUT ESOPHAGITIS: Primary | ICD-10-CM

## 2023-04-27 PROCEDURE — 99214 OFFICE O/P EST MOD 30 MIN: CPT

## 2023-04-27 RX ORDER — OMEPRAZOLE 20 MG/1
20 CAPSULE, DELAYED RELEASE ORAL DAILY
Qty: 30 CAPSULE | Refills: 2 | Status: SHIPPED | OUTPATIENT
Start: 2023-04-27

## 2023-04-27 NOTE — PATIENT INSTRUCTIONS
Continue medication as prescribed  Can try omeprazole for GERD, follow up if no improvement   Labs ordered - return to clinic 8-4:30 M-F (not 1P-2P during lunch). Will call with results.

## 2023-04-27 NOTE — PROGRESS NOTES
"Chief Complaint   Patient presents with   • Follow-up     6 week follow up       Subjective      Connie Murry is a 48 y.o. who presents for follow up on fatigue.  States her sleep schedule could be better - does not sleep as long as she should and thinks that is contributing to fatigue.  Has been taking iron every other day d/t constipation.  Tolerating well. Does have heavy menstrual periods.  They are regular.    Heartburn while working. - has tried pepcid and that helps.  Has had a few episodes this week.     The following portions of the patient's history were reviewed and updated as appropriate: allergies, current medications, past family history, past medical history, past social history, past surgical history and problem list.    Review of Systems   Constitutional: Positive for fatigue. Negative for chills and fever.   HENT: Negative.    Eyes: Negative.    Respiratory: Negative for chest tightness and shortness of breath.    Cardiovascular: Negative for chest pain and palpitations.   Gastrointestinal: Positive for GERD. Negative for abdominal pain, blood in stool, constipation, diarrhea, nausea and vomiting.   Endocrine: Negative.    Genitourinary: Positive for menstrual problem (heavy periods).   Neurological: Positive for light-headedness (with periods). Negative for dizziness and headache.       Objective   Vital Signs:  /64   Pulse 67   Temp 97.7 °F (36.5 °C)   Resp 18   Ht 170.2 cm (67\")   Wt 54 kg (119 lb)   SpO2 99%   BMI 18.64 kg/m²     BMI is within normal parameters. No other follow-up for BMI required.        Physical Exam  Vitals and nursing note reviewed.   Constitutional:       Appearance: Normal appearance.   HENT:      Head: Normocephalic.   Cardiovascular:      Rate and Rhythm: Normal rate and regular rhythm.      Heart sounds: Normal heart sounds.   Pulmonary:      Breath sounds: Normal breath sounds.   Abdominal:      General: Bowel sounds are normal. There is no distension. "      Tenderness: There is no abdominal tenderness.   Musculoskeletal:      Right lower leg: No edema.      Left lower leg: No edema.   Neurological:      General: No focal deficit present.      Mental Status: She is alert and oriented to person, place, and time.   Psychiatric:         Mood and Affect: Mood normal.         Behavior: Behavior normal.          Result Review                     Assessment and Plan  Diagnoses and all orders for this visit:    1. Gastroesophageal reflux disease without esophagitis (Primary)  -     omeprazole (priLOSEC) 20 MG capsule; Take 1 capsule by mouth Daily.  Dispense: 30 capsule; Refill: 2    2. Iron deficiency anemia, unspecified iron deficiency anemia type      Patient to follow up with GYN for heavy periods.    GERD - will try omeprazole daily. Discussed signs and symptoms of GI bleed / ulcer to report.          Patient Instructions   1. Continue medication as prescribed  2. Can try omeprazole for GERD, follow up if no improvement   3. Labs ordered - return to clinic 8-4:30 M-F (not 1P-2P during lunch). Will call with results.     Discussed medications prescribed and OTC medications recommended.  Discussed medication safety, possible side effects and how to take or administer medications. Instructed patient to report any adverse reactions, side effects or concerns.     Reviewed physical exam findings and plan with patient who verbalized understanding and agrees with plan of care. Patient was given opportunity to ask questions and all concerns were addressed prior to the conclusion of today's visit.       Follow Up  No follow-ups on file.  Patient was given instructions and counseling regarding her condition or for health maintenance advice. Please see specific information pulled into the AVS if appropriate.

## 2023-09-19 ENCOUNTER — OFFICE VISIT (OUTPATIENT)
Dept: FAMILY MEDICINE CLINIC | Facility: CLINIC | Age: 48
End: 2023-09-19
Payer: COMMERCIAL

## 2023-09-19 VITALS
HEART RATE: 66 BPM | WEIGHT: 118 LBS | OXYGEN SATURATION: 99 % | BODY MASS INDEX: 18.52 KG/M2 | TEMPERATURE: 98.4 F | SYSTOLIC BLOOD PRESSURE: 116 MMHG | DIASTOLIC BLOOD PRESSURE: 68 MMHG | RESPIRATION RATE: 18 BRPM | HEIGHT: 67 IN

## 2023-09-19 DIAGNOSIS — T75.3XXA MOTION SICKNESS, INITIAL ENCOUNTER: ICD-10-CM

## 2023-09-19 DIAGNOSIS — D50.9 IRON DEFICIENCY ANEMIA, UNSPECIFIED IRON DEFICIENCY ANEMIA TYPE: ICD-10-CM

## 2023-09-19 DIAGNOSIS — L30.9 DERMATITIS: Primary | ICD-10-CM

## 2023-09-19 RX ORDER — METHYLPREDNISOLONE 4 MG/1
TABLET ORAL
Qty: 21 TABLET | Refills: 0 | Status: SHIPPED | OUTPATIENT
Start: 2023-09-19

## 2023-09-19 RX ORDER — SCOLOPAMINE TRANSDERMAL SYSTEM 1 MG/1
1 PATCH, EXTENDED RELEASE TRANSDERMAL
Qty: 2 PATCH | Refills: 0 | Status: SHIPPED | OUTPATIENT
Start: 2023-09-19

## 2023-09-19 NOTE — PROGRESS NOTES
"Chief Complaint   Patient presents with    Rash     On hands and wrist itchy x 1 week and missed follow up for iron def.       Subjective      Connie Murry is a 48 y.o. who presents for rash on bilateral hands that is rough and itchy.  States it started a week ago. No known exposures.  No changes in lotions, soaps or detergents.   Going on cruise in two weeks - requesting something for motion sickness.   Patient also did not follow-up on iron deficiency previously.  The following portions of the patient's history were reviewed and updated as appropriate: allergies, current medications, past family history, past medical history, past social history, past surgical history, and problem list.    Review of Systems   Constitutional:  Negative for chills and fever.   HENT:  Negative for congestion, ear pain, postnasal drip, rhinorrhea and sore throat.    Respiratory:  Negative for chest tightness and shortness of breath.    Cardiovascular:  Negative for chest pain.   Skin:  Positive for rash.     Objective   Vital Signs:  /68   Pulse 66   Temp 98.4 °F (36.9 °C)   Resp 18   Ht 170.2 cm (67\")   Wt 53.5 kg (118 lb)   SpO2 99%   BMI 18.48 kg/m²             Physical Exam  Vitals and nursing note reviewed.   Constitutional:       Appearance: Normal appearance.   Skin:     Findings: Rash (Raised erythema with surrounding excoriation) present.   Neurological:      Mental Status: She is alert.        Result Review                     Assessment and Plan  Diagnoses and all orders for this visit:    1. Dermatitis (Primary)  -     methylPREDNISolone (MEDROL) 4 MG dose pack; Take as directed on package instructions.  Dispense: 21 tablet; Refill: 0    2. Iron deficiency anemia, unspecified iron deficiency anemia type  -     CBC & Differential; Future  -     Iron Profile; Future  -     Ferritin; Future    3. Motion sickness, initial encounter  -     Scopolamine 1 MG/3DAYS patch; Place 1 patch on the skin as directed by " provider Every 72 (Seventy-Two) Hours.  Dispense: 2 patch; Refill: 0      1.  Dermatitis-steroid Dosepak as ordered.  Patient was counseled related to risks and benefits of medication and chooses to proceed.    2. Iron deficiency anemia-repeat labs, plan for follow-up with lab results  3.  Motion sickness-patient going on Cruise - anticipates possible motion sickness.  Scopolamine patch prescribed and patient instructed related to use.        Discussed medications prescribed and OTC medications recommended.  Discussed medication safety, possible side effects and how to take or administer medications. Instructed patient to report any adverse reactions, side effects or concerns.     Reviewed physical exam findings and plan with patient who verbalized understanding and agrees with plan of care. Patient was given opportunity to ask questions and all concerns were addressed prior to the conclusion of today's visit.     Follow Up  No follow-ups on file.  Patient was given instructions and counseling regarding her condition or for health maintenance advice. Please see specific information pulled into the AVS if appropriate.     Note to patient: The 21st Century Cures Act makes medical notes like these available to patients in the interest of transparency. However, be advised this is a medical document. It is intended as peer to peer communication. It is written in medical language and may contain abbreviations or verbiage that are unfamiliar. It may appear blunt or direct. Medical documents are intended to carry relevant information, facts as evident, and the clinical opinion of the provider.

## 2023-09-22 LAB
BASOPHILS # BLD AUTO: 0 X10E3/UL (ref 0–0.2)
BASOPHILS NFR BLD AUTO: 0 %
EOSINOPHIL # BLD AUTO: 0.1 X10E3/UL (ref 0–0.4)
EOSINOPHIL NFR BLD AUTO: 1 %
ERYTHROCYTE [DISTWIDTH] IN BLOOD BY AUTOMATED COUNT: 13.1 % (ref 11.7–15.4)
FERRITIN SERPL-MCNC: 14 NG/ML (ref 15–150)
HCT VFR BLD AUTO: 32.8 % (ref 34–46.6)
HGB BLD-MCNC: 11 G/DL (ref 11.1–15.9)
IMM GRANULOCYTES # BLD AUTO: 0 X10E3/UL (ref 0–0.1)
IMM GRANULOCYTES NFR BLD AUTO: 0 %
IRON SATN MFR SERPL: 19 % (ref 15–55)
IRON SERPL-MCNC: 55 UG/DL (ref 27–159)
LYMPHOCYTES # BLD AUTO: 2 X10E3/UL (ref 0.7–3.1)
LYMPHOCYTES NFR BLD AUTO: 21 %
MCH RBC QN AUTO: 31.2 PG (ref 26.6–33)
MCHC RBC AUTO-ENTMCNC: 33.5 G/DL (ref 31.5–35.7)
MCV RBC AUTO: 93 FL (ref 79–97)
MONOCYTES # BLD AUTO: 0.9 X10E3/UL (ref 0.1–0.9)
MONOCYTES NFR BLD AUTO: 10 %
NEUTROPHILS # BLD AUTO: 6.4 X10E3/UL (ref 1.4–7)
NEUTROPHILS NFR BLD AUTO: 68 %
PLATELET # BLD AUTO: 233 X10E3/UL (ref 150–450)
RBC # BLD AUTO: 3.53 X10E6/UL (ref 3.77–5.28)
TIBC SERPL-MCNC: 294 UG/DL (ref 250–450)
UIBC SERPL-MCNC: 239 UG/DL (ref 131–425)
WBC # BLD AUTO: 9.4 X10E3/UL (ref 3.4–10.8)

## 2023-10-06 ENCOUNTER — TELEPHONE (OUTPATIENT)
Dept: FAMILY MEDICINE CLINIC | Facility: CLINIC | Age: 48
End: 2023-10-06
Payer: COMMERCIAL

## 2023-10-06 DIAGNOSIS — L30.9 DERMATITIS: Primary | ICD-10-CM

## 2023-10-06 NOTE — TELEPHONE ENCOUNTER
----- Message from Kasandra Henderson MA sent at 10/6/2023 12:43 PM EDT -----  Regarding: FW: MethylPREDNISolone  Contact: [phone]  Medication did not work for rash  ----- Message -----  From: Connie Murry  Sent: 10/5/2023   3:41 PM EDT  To: [facility] Clinical Pool  Subject: MethylPREDNISolone                               Jensen Nita the medicine that you prescribed for me last visit for rash didnt clear up

## 2023-10-09 NOTE — TELEPHONE ENCOUNTER
Caller: Connie Murry    Relationship: Self    Best call back number: [phone]     What specialty or service is being requested: DERMATOLOGIST    What is the office location: [facility] additional details: PATIENT PREFERS A CLOSER PROVIDER

## 2025-03-17 ENCOUNTER — OFFICE VISIT (OUTPATIENT)
Dept: FAMILY MEDICINE CLINIC | Facility: CLINIC | Age: 50
End: 2025-03-17
Payer: COMMERCIAL

## 2025-03-17 VITALS
HEIGHT: 67 IN | SYSTOLIC BLOOD PRESSURE: 126 MMHG | OXYGEN SATURATION: 99 % | TEMPERATURE: 97.3 F | WEIGHT: 119 LBS | BODY MASS INDEX: 18.68 KG/M2 | HEART RATE: 80 BPM | DIASTOLIC BLOOD PRESSURE: 82 MMHG

## 2025-03-17 DIAGNOSIS — Z13.1 SCREENING FOR DIABETES MELLITUS: ICD-10-CM

## 2025-03-17 DIAGNOSIS — E61.1 IRON DEFICIENCY: ICD-10-CM

## 2025-03-17 DIAGNOSIS — F41.9 ANXIETY: ICD-10-CM

## 2025-03-17 DIAGNOSIS — Z00.00 ENCOUNTER FOR WELL ADULT EXAM WITHOUT ABNORMAL FINDINGS: Primary | ICD-10-CM

## 2025-03-17 DIAGNOSIS — K21.9 GASTROESOPHAGEAL REFLUX DISEASE, UNSPECIFIED WHETHER ESOPHAGITIS PRESENT: ICD-10-CM

## 2025-03-17 DIAGNOSIS — R42 DIZZINESS: ICD-10-CM

## 2025-03-17 DIAGNOSIS — Z13.220 ENCOUNTER FOR LIPID SCREENING FOR CARDIOVASCULAR DISEASE: ICD-10-CM

## 2025-03-17 DIAGNOSIS — R13.10 DYSPHAGIA, UNSPECIFIED TYPE: ICD-10-CM

## 2025-03-17 DIAGNOSIS — R31.9 HEMATURIA, UNSPECIFIED TYPE: ICD-10-CM

## 2025-03-17 DIAGNOSIS — R10.30 LOWER ABDOMINAL PAIN: ICD-10-CM

## 2025-03-17 DIAGNOSIS — R00.2 HEART PALPITATIONS: ICD-10-CM

## 2025-03-17 DIAGNOSIS — Z13.6 ENCOUNTER FOR LIPID SCREENING FOR CARDIOVASCULAR DISEASE: ICD-10-CM

## 2025-03-17 LAB
BILIRUB BLD-MCNC: NEGATIVE MG/DL
CLARITY, POC: CLEAR
COLOR UR: ABNORMAL
EXPIRATION DATE: ABNORMAL
GLUCOSE UR STRIP-MCNC: NEGATIVE MG/DL
KETONES UR QL: NEGATIVE
LEUKOCYTE EST, POC: NEGATIVE
Lab: ABNORMAL
NITRITE UR-MCNC: NEGATIVE MG/ML
PH UR: 6 [PH] (ref 5–8)
PROT UR STRIP-MCNC: NEGATIVE MG/DL
RBC # UR STRIP: ABNORMAL /UL
SP GR UR: 1.03 (ref 1–1.03)
UROBILINOGEN UR QL: NORMAL

## 2025-03-17 PROCEDURE — 93000 ELECTROCARDIOGRAM COMPLETE: CPT | Performed by: PHYSICIAN ASSISTANT

## 2025-03-17 PROCEDURE — 99396 PREV VISIT EST AGE 40-64: CPT | Performed by: PHYSICIAN ASSISTANT

## 2025-03-17 PROCEDURE — 81003 URINALYSIS AUTO W/O SCOPE: CPT | Performed by: PHYSICIAN ASSISTANT

## 2025-03-17 RX ORDER — ESCITALOPRAM OXALATE 10 MG/1
10 TABLET ORAL DAILY
Qty: 30 TABLET | Refills: 2 | Status: SHIPPED | OUTPATIENT
Start: 2025-03-17

## 2025-03-17 RX ORDER — BUSPIRONE HYDROCHLORIDE 5 MG/1
5 TABLET ORAL 3 TIMES DAILY PRN
Qty: 90 TABLET | Refills: 2 | Status: SHIPPED | OUTPATIENT
Start: 2025-03-17

## 2025-03-17 RX ORDER — OMEPRAZOLE 20 MG/1
20 CAPSULE, DELAYED RELEASE ORAL DAILY
Qty: 30 CAPSULE | Refills: 2 | Status: SHIPPED | OUTPATIENT
Start: 2025-03-17

## 2025-03-17 NOTE — PROGRESS NOTES
Subjective   Connie Murry is a 49 y.o. female.     History of Present Illness   History of Present Illness  The patient is a 49-year-old female who presents for an annual physical. She is a patient of Dr. Jaime Arizmendi, with her last appointment in our office in September 2023. Since then, she has undergone a hysterectomy for menorrhagia with an irregular cycle, dyspareunia, and uterine leiomyoma. She has a history of iron deficiency and suffers from chronic dizziness and lightheadedness. A CTA in 2022 was normal.    She reports overall good health and satisfactory recovery from her hysterectomy. She has resumed her iron supplementation due to occasional feelings of fatigue, which she attributes to low iron levels. She does not experience pica symptoms such as cravings for ice chips or dirt when her iron levels are low. She maintains a regular schedule of annual mammograms, with the most recent one conducted in January 2024. She has also undergone colon cancer screening, with a full colonoscopy performed two years ago at [facility], and was advised to repeat the procedure in 10 years. She is uncertain about the date of her last tetanus booster. She has consumed a breakfast sandwich and leftovers from the previous night today. She declines the tetanus booster at this time.    She experiences a sensation of needing to vomit but does not feel nauseous. She occasionally feels as though food is lodged in her throat, particularly when consuming rice or roast, and wonders if this could be due to an allergy. This occurs sporadically, not on a weekly basis. She has recently begun experiencing heartburn and indigestion, which are not daily occurrences but happen frequently enough to be noticeable. She has been managing these symptoms with Tums as needed.    She has been under significant stress recently and has a history of anxiety, for which she has tried various medications. She tends to discontinue medication once  she feels better. She acknowledges that her body is changing as she ages and expresses discomfort with her current state. She experiences more stressful days than not and is unsure which medications have been effective for her in the past. She has previously tried BuSpar and Lexapro, the latter of which was increased from 10 mg to 20 mg after three months. She believes her dizziness may be stress-related and reports severe itching in her ears, which she frequently cleans with Q-tips. She is unsure if this is due to fluid accumulation or allergies, but she does have known allergies. She considers her seasonal allergies to be severe and takes Claritin as needed, which provides some relief. She describes her ears as very dry and itchy. She has never fainted but feels off balance. She experiences palpitations and has not kept a log of her symptoms. She reports feeling dizzy and lightheaded frequently, with no apparent triggers. 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 She admits to drinking three sodas a day and experiences withdrawal headaches if she skips them. She has switched from Mountain Dew to Sprite.    She has recently begun experiencing back pain, which she describes as similar to the pain she used to experience during her menstrual periods. She recalls a doctor suggesting that this could be due to a cyst attempting to pass. She does not experience diarrhea but occasionally feels constipated. She can go a day without a bowel movement and then return to normal the next day. She describes her stools as small and piece meal when constipated but returns to normal without the need for additional fiber or stool softeners.    SOCIAL HISTORY  She does not smoke.    FAMILY HISTORY  Her daughter has similar menstrual issues. She has a family history of stress.    MEDICATIONS  Current: iron supplements, Tums, Claritin  Past: BuSpar, Lexapro       The following portions  "of the patient's history were reviewed and updated as appropriate: allergies, current medications, past family history, past medical history, past social history, past surgical history, and problem list.    Review of Systems  As noted per HPI     Objective   Blood pressure 126/82, pulse 80, temperature 97.3 °F (36.3 °C), height 170.2 cm (67\"), weight 54 kg (119 lb), SpO2 99%. Body mass index is 18.64 kg/m².     Physical Exam  Vitals and nursing note reviewed.   Constitutional:       Appearance: She is well-developed.   HENT:      Head: Normocephalic and atraumatic.      Right Ear: Tympanic membrane, ear canal and external ear normal.      Left Ear: Tympanic membrane, ear canal and external ear normal.      Nose: Nose normal.      Mouth/Throat:      Pharynx: No oropharyngeal exudate.   Eyes:      Conjunctiva/sclera: Conjunctivae normal.   Neck:      Thyroid: No thyromegaly.      Trachea: No tracheal deviation.   Cardiovascular:      Rate and Rhythm: Normal rate and regular rhythm.   Pulmonary:      Effort: Pulmonary effort is normal. No respiratory distress.      Breath sounds: Normal breath sounds. No wheezing or rales.   Chest:      Chest wall: No tenderness.   Abdominal:      General: Bowel sounds are normal. There is no distension.      Palpations: Abdomen is soft. There is no mass.      Tenderness: There is no abdominal tenderness. There is no guarding or rebound.      Hernia: No hernia is present.   Musculoskeletal:      Cervical back: Normal range of motion and neck supple.   Lymphadenopathy:      Cervical: No cervical adenopathy.   Skin:     General: Skin is warm and dry.   Neurological:      Mental Status: She is alert and oriented to person, place, and time.   Psychiatric:         Behavior: Behavior normal.         Thought Content: Thought content normal.         Judgment: Judgment normal.           ECG 12 Lead    Date/Time: 3/17/2025 5:03 PM  Performed by: Nicole Francisco PA    Authorized by: Maryam, " RAJ Rivera  Comparison: not compared with previous ECG   Previous ECG: no previous ECG available  Rhythm: sinus rhythm  Rate: normal  Conduction: conduction normal  ST Segments: ST segments normal  T Waves: T waves normal  QRS axis: normal    Clinical impression: normal ECG          Results  Laboratory Studies  Iron levels were low in 2023.    Imaging  CTA in 2022 was normal.    Assessment & Plan   Assessment & Plan  1. Annual physical examination.  Her iron levels will be re-evaluated, given that they have not been assessed since 2023. She has expressed a desire to continue with her annual mammograms. Her last colonoscopy was performed two years ago, and she is scheduled for a repeat procedure in 10 years. She has declined the tetanus booster at this time. A comprehensive blood workup will be conducted. She has been advised to return for a fasting blood draw. A urine culture will be ordered to rule out any potential urinary tract infections.    2. Dysphagia.  Her dysphagia could be attributed to uncontrolled acid reflux, leading to esophageal irritation and inflammation, or a potential esophageal stricture. She has been advised to avoid caffeine, chocolate, alcohol, tomato-based foods, and citrus, as these can exacerbate indigestion and reflux. She has also been counseled on the importance of stress management, as stress can increase stomach acid production. A prescription for omeprazole has been provided, to be taken regularly for several days. If her symptoms persist, an EGD will be considered to rule out any esophageal blockages.    3. Anxiety.  Her anxiety may be contributing to her lightheadedness. She has been advised to maintain adequate hydration, aiming for a daily water intake of 80 ounces, and to reduce her soda consumption. She will commence a regimen of Lexapro, followed by the addition of BuSpar after several days. A 30-day supply of these medications has been provided. If her symptoms persist despite  adequate hydration and normal lab results, a referral to a neurologist will be considered.    4. Back pain.  Her back pain could be a result of scar tissue formation following her laparoscopic hysterectomy. The medication prescribed for her dysphagia should also alleviate her indigestion and subsequently reduce her abdominal pain. She has been advised to use a stool softener if she experiences straining during bowel movements. If her symptoms worsen or persist, she will inform us.    PROCEDURE  The patient underwent a hysterectomy for menorrhagia with an irregular cycle, dyspareunia, and uterine leiomyoma.     Diagnoses and all orders for this visit:    1. Encounter for well adult exam without abnormal findings (Primary)  -     POCT urinalysis dipstick, automated  -     CBC w AUTO Differential  -     Comprehensive metabolic panel  -     Lipid Panel  -     Hemoglobin A1c  -     TSH  -     T4, free  -     Thyroid Antibodies  -     Iron Profile  -     Ferritin  -     Vitamin B12  -     Folate  -     Magnesium  -     Vitamin B1, Whole Blood  -     Lipase  -     Insulin, Total  -     Urine Culture - Urine, Urine, Clean Catch  -     ECG 12 Lead    2. Lower abdominal pain  -     POCT urinalysis dipstick, automated  -     Lipase    3. Dizziness  -     CBC w AUTO Differential  -     Comprehensive metabolic panel  -     Hemoglobin A1c  -     TSH  -     T4, free  -     Thyroid Antibodies  -     Iron Profile  -     Ferritin  -     Vitamin B12  -     Folate  -     Magnesium  -     Vitamin B1, Whole Blood  -     Insulin, Total    4. Encounter for lipid screening for cardiovascular disease  -     Lipid Panel    5. Screening for diabetes mellitus  -     Hemoglobin A1c    6. Iron deficiency  -     CBC w AUTO Differential  -     Iron Profile  -     Ferritin    7. Gastroesophageal reflux disease, unspecified whether esophagitis present  -     omeprazole (priLOSEC) 20 MG capsule; Take 1 capsule by mouth Daily.  Dispense: 30 capsule;  Refill: 2    8. Dysphagia, unspecified type  -     omeprazole (priLOSEC) 20 MG capsule; Take 1 capsule by mouth Daily.  Dispense: 30 capsule; Refill: 2    9. Anxiety  -     escitalopram (Lexapro) 10 MG tablet; Take 1 tablet by mouth Daily.  Dispense: 30 tablet; Refill: 2  -     busPIRone (BUSPAR) 5 MG tablet; Take 1 tablet by mouth 3 (Three) Times a Day As Needed (anxiety).  Dispense: 90 tablet; Refill: 2    10. Hematuria, unspecified type  -     Urine Culture - Urine, Urine, Clean Catch    11. Heart palpitations  -     ECG 12 Lead      Counseling was given to patient for the following topics: instructions for management, risk factor reductions, patient and family education, and impressions . Total time of the encounter was 20 minutes and 10 minutes was spent counseling.           Patient or patient representative verbalized consent for the use of Ambient Listening during the visit with  RAJ Navarrete for chart documentation. 3/20/2025  16:01 EDT

## 2025-03-20 LAB
BACTERIA UR CULT: ABNORMAL
BACTERIA UR CULT: ABNORMAL
OTHER ANTIBIOTIC SUSC ISLT: ABNORMAL

## 2025-03-21 ENCOUNTER — LAB (OUTPATIENT)
Dept: FAMILY MEDICINE CLINIC | Facility: CLINIC | Age: 50
End: 2025-03-21
Payer: COMMERCIAL

## 2025-03-26 LAB
ALBUMIN SERPL-MCNC: 4.5 G/DL (ref 3.9–4.9)
ALP SERPL-CCNC: 49 IU/L (ref 44–121)
ALT SERPL-CCNC: 9 IU/L (ref 0–32)
AST SERPL-CCNC: 15 IU/L (ref 0–40)
BASOPHILS # BLD AUTO: 0 X10E3/UL (ref 0–0.2)
BASOPHILS NFR BLD AUTO: 1 %
BILIRUB SERPL-MCNC: 0.6 MG/DL (ref 0–1.2)
BUN SERPL-MCNC: 10 MG/DL (ref 6–24)
BUN/CREAT SERPL: 13 (ref 9–23)
CALCIUM SERPL-MCNC: 9.4 MG/DL (ref 8.7–10.2)
CHLORIDE SERPL-SCNC: 105 MMOL/L (ref 96–106)
CHOLEST SERPL-MCNC: 193 MG/DL (ref 100–199)
CO2 SERPL-SCNC: 23 MMOL/L (ref 20–29)
CREAT SERPL-MCNC: 0.75 MG/DL (ref 0.57–1)
EGFRCR SERPLBLD CKD-EPI 2021: 99 ML/MIN/1.73
EOSINOPHIL # BLD AUTO: 0.3 X10E3/UL (ref 0–0.4)
EOSINOPHIL NFR BLD AUTO: 6 %
ERYTHROCYTE [DISTWIDTH] IN BLOOD BY AUTOMATED COUNT: 12.7 % (ref 11.7–15.4)
FERRITIN SERPL-MCNC: 109 NG/ML (ref 15–150)
FOLATE SERPL-MCNC: 14.7 NG/ML
GLOBULIN SER CALC-MCNC: 2.8 G/DL (ref 1.5–4.5)
GLUCOSE SERPL-MCNC: 80 MG/DL (ref 70–99)
HBA1C MFR BLD: 5.4 % (ref 4.8–5.6)
HCT VFR BLD AUTO: 39.8 % (ref 34–46.6)
HDLC SERPL-MCNC: 78 MG/DL
HGB BLD-MCNC: 12.8 G/DL (ref 11.1–15.9)
IMM GRANULOCYTES # BLD AUTO: 0 X10E3/UL (ref 0–0.1)
IMM GRANULOCYTES NFR BLD AUTO: 0 %
INSULIN SERPL-ACNC: 6.7 UIU/ML (ref 2.6–24.9)
IRON SATN MFR SERPL: 26 % (ref 15–55)
IRON SERPL-MCNC: 75 UG/DL (ref 27–159)
LDLC SERPL CALC-MCNC: 107 MG/DL (ref 0–99)
LIPASE SERPL-CCNC: 32 U/L (ref 14–72)
LYMPHOCYTES # BLD AUTO: 2.4 X10E3/UL (ref 0.7–3.1)
LYMPHOCYTES NFR BLD AUTO: 40 %
MAGNESIUM SERPL-MCNC: 2.1 MG/DL (ref 1.6–2.3)
MCH RBC QN AUTO: 30 PG (ref 26.6–33)
MCHC RBC AUTO-ENTMCNC: 32.2 G/DL (ref 31.5–35.7)
MCV RBC AUTO: 93 FL (ref 79–97)
MONOCYTES # BLD AUTO: 0.6 X10E3/UL (ref 0.1–0.9)
MONOCYTES NFR BLD AUTO: 10 %
NEUTROPHILS # BLD AUTO: 2.6 X10E3/UL (ref 1.4–7)
NEUTROPHILS NFR BLD AUTO: 43 %
PLATELET # BLD AUTO: 286 X10E3/UL (ref 150–450)
POTASSIUM SERPL-SCNC: 4.2 MMOL/L (ref 3.5–5.2)
PROT SERPL-MCNC: 7.3 G/DL (ref 6–8.5)
RBC # BLD AUTO: 4.27 X10E6/UL (ref 3.77–5.28)
SODIUM SERPL-SCNC: 142 MMOL/L (ref 134–144)
T4 FREE SERPL-MCNC: 1.15 NG/DL (ref 0.82–1.77)
THYROGLOB AB SERPL-ACNC: <1 IU/ML (ref 0–0.9)
THYROPEROXIDASE AB SERPL-ACNC: 10 IU/ML (ref 0–34)
TIBC SERPL-MCNC: 291 UG/DL (ref 250–450)
TRIGL SERPL-MCNC: 39 MG/DL (ref 0–149)
TSH SERPL DL<=0.005 MIU/L-ACNC: 1.33 UIU/ML (ref 0.45–4.5)
UIBC SERPL-MCNC: 216 UG/DL (ref 131–425)
VIT B1 BLD-SCNC: 95.1 NMOL/L (ref 66.5–200)
VIT B12 SERPL-MCNC: 1124 PG/ML (ref 232–1245)
VLDLC SERPL CALC-MCNC: 8 MG/DL (ref 5–40)
WBC # BLD AUTO: 6 X10E3/UL (ref 3.4–10.8)

## 2025-05-09 DIAGNOSIS — F41.9 ANXIETY: ICD-10-CM

## 2025-05-12 RX ORDER — ESCITALOPRAM OXALATE 10 MG/1
10 TABLET ORAL DAILY
Qty: 30 TABLET | Refills: 2 | Status: SHIPPED | OUTPATIENT
Start: 2025-05-12